# Patient Record
Sex: FEMALE | Race: WHITE
[De-identification: names, ages, dates, MRNs, and addresses within clinical notes are randomized per-mention and may not be internally consistent; named-entity substitution may affect disease eponyms.]

---

## 2018-08-31 ENCOUNTER — HOSPITAL ENCOUNTER (OUTPATIENT)
Dept: HOSPITAL 39 - ER | Age: 83
Setting detail: OBSERVATION
LOS: 1 days | Discharge: NURSING, REHAB OR LONG-TERM CARE | End: 2018-09-01
Attending: NURSE PRACTITIONER | Admitting: NURSE PRACTITIONER
Payer: MEDICARE

## 2018-08-31 DIAGNOSIS — R55: ICD-10-CM

## 2018-08-31 DIAGNOSIS — R42: ICD-10-CM
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
## 2018-08-31 DIAGNOSIS — Z85.3: ICD-10-CM

## 2018-08-31 DIAGNOSIS — E11.9: ICD-10-CM

## 2018-08-31 DIAGNOSIS — E87.6: ICD-10-CM

## 2018-08-31 DIAGNOSIS — Z98.1: ICD-10-CM

## 2018-08-31 DIAGNOSIS — Z88.7: ICD-10-CM

## 2018-08-31 DIAGNOSIS — Z88.6: ICD-10-CM

## 2018-08-31 DIAGNOSIS — S52.571A: Primary | ICD-10-CM

## 2018-08-31 DIAGNOSIS — Z88.8: ICD-10-CM

## 2018-08-31 DIAGNOSIS — Z90.13: ICD-10-CM

## 2018-08-31 DIAGNOSIS — H91.8X3: ICD-10-CM

## 2018-08-31 DIAGNOSIS — Z79.2: ICD-10-CM
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
## 2018-08-31 DIAGNOSIS — I45.10: ICD-10-CM

## 2018-08-31 DIAGNOSIS — N30.20: ICD-10-CM

## 2018-08-31 DIAGNOSIS — S09.90XA: ICD-10-CM

## 2018-08-31 DIAGNOSIS — E83.42: ICD-10-CM

## 2018-08-31 DIAGNOSIS — S59.292A: ICD-10-CM

## 2018-08-31 DIAGNOSIS — E03.9: ICD-10-CM

## 2018-08-31 DIAGNOSIS — Z79.84: ICD-10-CM

## 2018-08-31 DIAGNOSIS — I10: ICD-10-CM

## 2018-08-31 DIAGNOSIS — Z79.891: ICD-10-CM

## 2018-08-31 DIAGNOSIS — S59.092A: ICD-10-CM

## 2018-08-31 DIAGNOSIS — M25.512: ICD-10-CM

## 2018-08-31 DIAGNOSIS — Z96.643: ICD-10-CM

## 2018-08-31 DIAGNOSIS — Z79.899: ICD-10-CM

## 2018-08-31 DIAGNOSIS — M25.522: ICD-10-CM

## 2018-08-31 PROCEDURE — 97162 PT EVAL MOD COMPLEX 30 MIN: CPT

## 2018-08-31 PROCEDURE — 85025 COMPLETE CBC W/AUTO DIFF WBC: CPT

## 2018-08-31 PROCEDURE — 97530 THERAPEUTIC ACTIVITIES: CPT

## 2018-08-31 PROCEDURE — 96375 TX/PRO/DX INJ NEW DRUG ADDON: CPT

## 2018-08-31 PROCEDURE — 83880 ASSAY OF NATRIURETIC PEPTIDE: CPT

## 2018-08-31 PROCEDURE — 81001 URINALYSIS AUTO W/SCOPE: CPT

## 2018-08-31 PROCEDURE — 85610 PROTHROMBIN TIME: CPT

## 2018-08-31 PROCEDURE — 80053 COMPREHEN METABOLIC PANEL: CPT

## 2018-08-31 PROCEDURE — 36415 COLL VENOUS BLD VENIPUNCTURE: CPT

## 2018-08-31 PROCEDURE — 73030 X-RAY EXAM OF SHOULDER: CPT

## 2018-08-31 PROCEDURE — 96372 THER/PROPH/DIAG INJ SC/IM: CPT

## 2018-08-31 PROCEDURE — 73070 X-RAY EXAM OF ELBOW: CPT

## 2018-08-31 PROCEDURE — 84484 ASSAY OF TROPONIN QUANT: CPT

## 2018-08-31 PROCEDURE — 29125 APPL SHORT ARM SPLINT STATIC: CPT

## 2018-08-31 PROCEDURE — 85730 THROMBOPLASTIN TIME PARTIAL: CPT

## 2018-08-31 PROCEDURE — 99285 EMERGENCY DEPT VISIT HI MDM: CPT

## 2018-08-31 PROCEDURE — 83735 ASSAY OF MAGNESIUM: CPT

## 2018-08-31 PROCEDURE — 82948 REAGENT STRIP/BLOOD GLUCOSE: CPT

## 2018-08-31 PROCEDURE — 73110 X-RAY EXAM OF WRIST: CPT

## 2018-08-31 PROCEDURE — 96367 TX/PROPH/DG ADDL SEQ IV INF: CPT

## 2018-08-31 PROCEDURE — 82553 CREATINE MB FRACTION: CPT

## 2018-08-31 PROCEDURE — 93005 ELECTROCARDIOGRAM TRACING: CPT

## 2018-08-31 PROCEDURE — 80048 BASIC METABOLIC PNL TOTAL CA: CPT

## 2018-08-31 PROCEDURE — 94760 N-INVAS EAR/PLS OXIMETRY 1: CPT

## 2018-08-31 PROCEDURE — 72040 X-RAY EXAM NECK SPINE 2-3 VW: CPT

## 2018-08-31 PROCEDURE — 96365 THER/PROPH/DIAG IV INF INIT: CPT

## 2018-08-31 PROCEDURE — 36416 COLLJ CAPILLARY BLOOD SPEC: CPT

## 2018-08-31 PROCEDURE — 70450 CT HEAD/BRAIN W/O DYE: CPT

## 2018-08-31 PROCEDURE — 82550 ASSAY OF CK (CPK): CPT

## 2018-08-31 PROCEDURE — 87086 URINE CULTURE/COLONY COUNT: CPT

## 2018-08-31 PROCEDURE — 96366 THER/PROPH/DIAG IV INF ADDON: CPT

## 2018-08-31 RX ADMIN — INSULIN LISPRO SCH: 100 INJECTION, SOLUTION INTRAVENOUS; SUBCUTANEOUS at 17:57

## 2018-08-31 RX ADMIN — INSULIN LISPRO SCH UNITS: 100 INJECTION, SOLUTION INTRAVENOUS; SUBCUTANEOUS at 21:02

## 2018-08-31 RX ADMIN — POTASSIUM CHLORIDE SCH MEQ: 20 TABLET, EXTENDED RELEASE ORAL at 17:59

## 2018-08-31 RX ADMIN — HYDROCODONE BITARTRATE AND ACETAMINOPHEN PRN EA: 5; 325 TABLET ORAL at 16:09

## 2018-08-31 RX ADMIN — POTASSIUM CHLORIDE, DEXTROSE MONOHYDRATE AND SODIUM CHLORIDE PRN MLS/HR: 150; 5; 900 INJECTION, SOLUTION INTRAVENOUS at 16:08

## 2018-08-31 RX ADMIN — HYDROCODONE BITARTRATE AND ACETAMINOPHEN PRN EA: 5; 325 TABLET ORAL at 20:42

## 2018-08-31 NOTE — RAD
EXAM DESCRIPTION:

Wrist,Right 3 Views



CLINICAL HISTORY:

82 years Female, near syncope fall



COMPARISON:

None.



FINDINGS:

Four views of the right wrist show an impacted, nondisplaced

transversely oriented distal right radial metaphyseal fracture

with mild comminution and intra-articular extension. No ulnar

fracture is identified. The carpal bones are intact. The bones

appear demineralized. Degenerative changes are noted in multiple

locations, worse at the radiocarpal and first CMC joints.

Vascular calcifications are present.



IMPRESSION:

Impacted, slightly comminuted distal right radial fracture with

intra-articular extension and no significant displacement.



Generalized bony demineralization, degenerative changes and

vascular calcifications.



Electronically signed by:  Dante Reddy MD  8/31/2018 11:07 AM CDT

Workstation: [id number]

## 2018-08-31 NOTE — CT
EXAM DESCRIPTION: 

Head: Computed Tomography.



CLINICAL HISTORY: 

nears syncope, fall



COMPARISON:

Multiple orthopedic radiographs on this visit. CT head without

contrast 3/15/2012.



TECHNIQUE: 

Non-helical axial scans through the skull and brain, at 5.0 mm

intervals, non-contrast. Coronal and sagittal 2.0 mm

reconstructions.  Helical axial 2.5 mm reconstructions. Total

Exam DLP: 859.97 mGy-cm.  This exam was performed according to

our departmental dose-optimization program which includes

automated exposure control, adjustment of the mA and/or kV

according to patient size and/or use of iterative reconstruction

technique; to reduce radiation dose to as low as reasonably

achievable (ALARA).



FINDINGS: 

No intra-axial hemorrhage, no mass-effect, and no midline shift.

Normal gray-white matter differentiation. Bilateral low-density

in the extreme capsules, abutting the insular cortices. Stable

since the prior study. Scattered bilateral low-density in the

subcortical white matter more than the periventricular white

matter. New focal area of low-density in the anterior right

parietal subcortical white matter near the vertex since the prior

study.  No abnormal radiodense material in the brain parenchyma.

Vascular calcifications anterior and posterior.; physiologic

calcifications in the pineal gland and choroid plexus. 



No effacement or displacement of the ventricles, CSF spaces, or

subdural spaces. No extra axial fluid collection or hemorrhage.

No gross abnormalities of the bony calvarium. Bossing of the

bilateral inner tables of the parietal bones more prevalent

toward the vertex of the calvarium. Sclerosis of some of the

walls of the inferior right mastoid air cells with loss of air

cells. No fractures.



IMPRESSION: 

1. No hemorrhage, no mass effect, no midline shift. Multiple old

bilateral areas of ischemia more prevalent in the subcortical

white matter and in the periventricular white matter. Bilateral

low-density in the external capsules abutting the insular cortex

is stable. New region of low-density in the anterior right

parietal subcortical white matter near the vertex since the prior

study, but no mass effect.

2. CT scans are insensitive for detecting small CVAs in the first

24 hours after onset. Evaluation of the brain stem is also

limited. If symptoms persist, consider NON-EMERGENT MRI scan of

the brain with diffusion imaging.

3. Bilateral bossing of the inner table of the parietal bones and

sclerosis and thickening of the walls around the inferior right

mastoid air cells stable since the prior study.



Electronically signed by:  Bucky Bland MD  8/31/2018 10:50 AM

CDT Workstation: [id number]

## 2018-08-31 NOTE — RAD
EXAM DESCRIPTION:

Cervical Spine,3 Views



CLINICAL HISTORY:

82 years Female, near syncope fall



COMPARISON:

None.



FINDINGS:

Four views of the cervical spine show postoperative changes

related to previous ACDF at C5-6 and C6-7. No hardware

complication is identified. No vertebral body fracture or

subluxation is seen. No prevertebral soft tissue swelling.

Degenerative disc disease is noted at C3-4 and C4-5. Facet joint

degeneration is also noted at several levels. Soft tissue

calcifications in the right side of the neck may be of vascular

origin.



IMPRESSION:

Postoperative and degenerative changes, but no acute cervical

spine abnormality.



Possible carotid artery disease. If relevant, nonemergent

ultrasound should be considered for further evaluation.



Electronically signed by:  Dante Reddy MD  8/31/2018 11:06 AM CDT

Workstation: [id number]

## 2018-08-31 NOTE — HP
SUPERVISING PHYSICIAN:  Geovani Bah M.D.



CHIEF COMPLAINT:  Bilateral wrist pain.



HISTORY OF PRESENT ILLNESS:  Ms. Erazo is an 82 year-old  female 
patient that presented to the Emergency Room today after she had a near 
syncopal episode resulting in a fall at home.  The patient noted that she had 
not been feeling well over the last couple of days.  When she was getting up 
from a supine position to go to the bathroom, she had a near syncopal episode.  
She denied that she had passed out and that she had hit her head.  On arrival 
to the Emergency Room, she was having some pain in her left wrist as well as 
discomfort extending up the left arm.  She was also noted to have some pain in 
the right wrist.  Workup in the E. R. included radiographic studies of 
bilateral wrists which showed bilateral wrist fractures.  She also had a CT of 
the head without contrast and per radiology interpretation showed no hemorrhage
, mass effects or other acute findings, but there was note of a new region of 
low density in the anterior right parietal subcortical white matter near the 
vertex since previous exam in the last 6 years, but no mass effect.  She also 
had x-rays of the cervical spine that showed just postoperative degenerative 
changes, but no acute cervical spine abnormalities.  She also had examination 
radiographically of her left shoulder and left elbow which showed no acute 
abnormalities of the shoulder as well as without any acute findings of the left 
elbow per radiology interpretation.  Her laboratories were showing a normal 
white count and coagulation studies.  Chemistries showed mild hypokalemia with 
potassium 3.1, glucose 156.  Magnesium was shown to be low at 1.7, otherwise 
liver functions were all within normal limits as well as cardiac enzymes were 
shown to be within normal limits.  Vital signs on presentation to the Emergency 
Room showed that she was afebrile with temperature 96.6 and normotensive with 
blood pressure 135/64, heart rate 63, satting 96% on room air.  Dr. Diaz was 
consulted in the E. R. in regards to treatment of the bilateral wrist fractures 
who recommended conservative measures with splints on both wrists and followup 
in the outpatient setting.  Dr. Mg discussed plan of care in regards to the 
wrists and agreement was that conservative measures would be taken.  Given that 
the patient had a syncopal episode that was more likely related to diuretic use 
as she is on multiple diuretics, including Zebeta, Hydrochlorothiazide and Lasix
, the patient may have been having some orthostasis.  The patient also notes 
that she does have a history of vertigo and she did have some dizziness over 
the last several weeks periodically.  She is also taking Meclizine as needed 
for the dizziness.  Her EKG in the Emergency Department showed sinus 
bradycardia at 58 with an incomplete right bundle branch block, but no acute ST 
segment changes concerning for immediate ischemia.  The patient now is going to 
be placed in Observation overnight for further cardiac telemetry monitoring as 
well as neurologic checks given that she has had a syncopal episode as well as 
to assist with pain management in regards to the wrist fractures and a Physical 
Therapy consultation in the morning to further assess the patient's ability to 
function as she does live by herself at home.  She was placed in Observation in 
stable condition.



PAST MEDICAL HISTORY: 

1.   Hypertension.

2.   Hypothyroidism.

3.   Chronic vertigo.

4.   Diabetes mellitus type 2, diet controlled.

5.   Chronic kidney infections on Macrodantin.

6.   Extreme hearing loss bilaterally.

7.   Breast cancer with bilateral mastectomies in 1997.



PAST SURGICAL HISTORY:

1.   Bilateral mastectomy in 1997.

2.   Hysterectomy in 1997.

3.   Bilateral hip replacement.

4.   Cervical spine infusion.

5.   Bilateral cataract removal.



HOME MEDICATIONS:

1.   Macrobid 100 mg at bedtime.

2.   Ziac 1 b.i.d.

3.   Potassium chloride 20 mEq b.i.d.

4.   Levothyroxine 25 mcg at breakfast.

5.   Allegra 180 mg daily.

6.   Chlorthalidone 25 mg daily as needed for lower extremity swelling.

7.   Norco 5/325 one tablet every 6 hours as needed for pain.

8.   Lisinopril 10 mg daily.

9.   Lasix 80 mg daily.



ALLERGIES:  INFLUENZA VACCINE, ASPIRIN AND PREDNISONE.



FAMILY HISTORY:  Positive for vertigo in one of her brothers as well as 
diabetes mellitus, cancer and heart disease.



SOCIAL HISTORY:  The patient is retired.  She is  and lives in [place], [place].  She does live by herself.  She denies ever utilizing tobacco or 
drinking alcohol.



REVIEW OF SYSTEMS:  

CONSTITUTIONAL:  Noted just general malaise.  Denies any fevers, chills or 
weight change.

HEENT:  Denies any headaches, sore throat, nasal congestion or ear aches.  She 
does have a history of extreme hard-of-hearing as well as vertigo.

RESPIRATORY:  Denies any shortness of breath, coughing or wheezing.

CARDIOVASCULAR:  Denies any chest pains, palpitations, but as noted above near 
syncopal episode.

GASTROINTESTINAL:  Denies any nausea, vomiting or diarrhea.

GENITOURINARY:  Chronic history of urinary tract infections on Macrodantin with 
no reported symptoms, including dysuria, hematuria or polyuria, or other 
urinary symptoms.

MUSCULOSKELETAL:  As noted in history of present illness, bilateral wrist 
fractures.

INTEGUMENT:  No notable areas of skin breakdown, lesions, rashes, bruising.

NEUROLOGIC: She denies any headaches, ataxia.  As noted in history of present 
illness, syncopal or near syncopal episode.  Again no seizure activity.



PHYSICAL EXAMINATION: 



VITAL SIGNS:  Temperature 96.9, pulse 63, blood pressure 135/64, respirations 18
, satting 96% on room air.  Admission weight 75.0 kg.



GENERAL:  On admission to the Medical/Surgical floor, the patient appears 
comfortable, resting.  Currently eating a sandwich.  Appears to be in no acute 
distress.  She is alert and oriented.



HEENT:  Tympanic membranes are clear bilaterally.  She is extremely hard-of-
hearing bilaterally.  Oropharynx was pink and moist without any lesions.  



NECK:  There is no jugular venous distention.



CHEST:  Clear to auscultation bilaterally.



CARDIOVASCULAR:  Regular rate and rhythm.



ABDOMEN:  Soft, non-tender.  Positive bowel sounds.



EXTREMITIES:  Bilateral wrist splints in place as noted in History of Present 
Illness with capillary refill brisk bilaterally.  Strong , equal.  No 
clubbing, cyanosis or edema.



NEUROLOGIC:  No motor or sensory deficits are noted.  She remains alert and 
oriented times three.  Cranial nerves II-XII are grossly intact.  Facial 
features were symmetrical.  Extraocular movements are within normal limits.



LABORATORY:  White count on admission was showing normal at 5,100, hemoglobin 
13.8, hematocrit 40.0, platelet count 185,000.  Differential shows to be 
without a left shift.  Coagulations show normal PT and PTT.  Chemistries showed 
a mild low potassium at 3.1, BUN 25, creatinine 0.91.  Magnesium was low at 1.7
, calcium was normal at 9.2.  Bilirubin was slightly elevated at 1.5.  Other 
enzymes were all negative for elevation with an initial troponin of less than 
0.02.  Urinalysis was pending.  



RADIOLOGY:  She had multiple radiographic studies in the E. R., including 
bilateral wrists with right wrist showing an impacted nondisplaced transversely 
oriented distal right radial metaphyseal fracture with mild comminution and 
intraarticular extension but no ulnar fractures.  Her carpal bones are showing 
to be intact.  She also had a left wrist which showed transversely oriented 
slightly displaced and impacted distal left radial metaphyseal fracture with a 
mild dorsal angulation but no definite intraarticular extension.  There was 
note of a nondisplaced slightly impacted distal left ulnar and metaphysis 
fracture with other chronic changes as noted.  She had an x-ray of the left 
shoulder and elbow that showed just degenerative changes and no acute trauma.  
She had a CT of the head without contrast and per radiology interpretation 
showed no hemorrhage, mass effects or midline shift.  There was note of 
multiple old bilateral areas of ischemia more prevalent in the subcortical 
white matter and periventricular white matter with bilateral low-densities in 
the external capsules abutting the insular cortex, parietal subcortical white 
matter near the vertex since previous exam but no mass effect.  The cervical 
spine per radiology interpretation showed postoperative degenerative changes 
but no acute cervical spine abnormalities.  Again, x-rays of the right and left 
wrist showed on the right wrist an impacted nondisplaced transversally oriented 
distal right radial metaphyseal fracture with mild comminution and 
intraarticular extensions.  No ulnar fracture was identified.  The carpal bones 
were intact.  Left wrist per radiology interpretation showed left radial and 
ulnar fractures.  She also had a left shoulder and right elbow x-ray again with 
no acute findings.



ASSESSMENT: 

1.   Bilateral wrist fractures secondary to same level fall without mention of 
loss of 

      consciousness.

2.   Near syncopal episode, uncertain etiology although possibly related to 
excessive

      diuretic usage, again contributing to #1.

3.   Hypertension on multiple diuretics and blood pressure medicines possibly

      related to the syncopal episodes.

4.   Hypothyroidism.

5.   Chronic vertigo possibly contributing to #1.

6.   Type 2 diabetes mellitus on oral therapy only.

7.   Chronic cystitis on Macrodantin.

8.   Chronic hearing loss.

9.   Chronic vertigo possibly contributing to same level fall as noted in #1.



PLAN:  The patient is going to be placed in Observation tonight to be on 
cardiac telemetry as well as close neurological monitoring.  Will continue to 
await for urine cultures, but given that she has a history of chronic urinary 
tract infections and she  has not been symptomatic, will continue to await 
results of  UA.  Will anticipate length of stay to be at least 1 to 2 days with 
anticipation of being able to discharge tomorrow.  She will need a physical 
therapy consultation given that she has bilateral wrist fractures and chronic 
vertigo to ensure that she is safe to return home once clinically improved.  
Will continue with antibiotic coverage currently awaiting final culture results 
to target antibiotic therapy.  Until then, will continue to monitor and treat 
appropriately.  Again, hopefully be able to discharge in the morning.  Once 
stable and able to discharge, she will need close clinical followup with her 
primary care provider, Dr. Holbrook.



#[id number]
[facility] HospitalALFONSO

## 2018-08-31 NOTE — ED.PDOC
History of Present Illness





- General


Chief Complaint: Upper Extremity Injury


Stated Complaint: Bilateral wrist discomfort


Time Seen by Provider: 18 10:04


Source: patient


Exam Limitations: no limitations





- History of Present Illness


Initial Comments: 





the patient is an 82-year-old  female presenting to the emergency room 

after having a near syncopal episode that caused the fall in her home prior to 

arrival here.  The patient has not been feeling very well over the last couple 

of days.  She was getting up from lying down to go to the bathroom when she had 

the near-syncopal episode.  She did not pass out completely and does not think 

that she hit her head.  She is having some pain in the left wrist as well as 

mild discomfort extending up the left arm.  She is also having some pain in the 

right wrist.  She is not reporting any head pain.  She does have some mild neck 

pain though she states that she did not hit her head or her neck.  She has had 

a previous fusion in the past.  No other reported injuries.  She is alert and 

oriented.


Timing/Duration: momentarily


Severity: moderate


Improving Factors: immobilization


Worsening Factors: movement


Associated Symptoms: denies symptoms


Allergies/Adverse Reactions: 


Allergies





UNOBTAINABLE Allergy (Verified 18 10:13)


 








Home Medications: 


Ambulatory Orders





Bisoprolol Fumarate [Zebeta] 5 mg PO DAILY 18 


Chlorthalidone 25 mg PO DAILY 18 


Fexofenadine HCl [Allegra Allergy] 180 mg PO DAILY 18 


Furosemide Tab [Lasix Tab] 80 mg PO DAILY 18 


HYDROcodone 5MG/APAP 325MG [Norco 5/325] 1 tab PO Q6H PRN 18 


Levothyroxine Sodium 25 mcg PO DAILY 18 


Lisinopril 10 mg PO DAILY 18 


Potassium Chloride [K-Tab] 20 meq PO BID 18 











Review of Systems





- Review of Systems


Constitutional: States: malaise


EENTM: States: no symptoms reported


Respiratory: States: no symptoms reported


Cardiology: States: no symptoms reported


Gastrointestinal/Abdominal: States: no symptoms reported


Genitourinary: States: no symptoms reported


Musculoskeletal: States: see HPI


Skin: States: no symptoms reported


Neurological: States: headache


Endocrine: States: no symptoms reported


All other Systems: No Change from Baseline





Past Medical History (General)





- Patient Medical History


Hx Stroke: No


Hx Congestive Heart Failure: No


Hx Hypertension: Yes


Hx Diabetes: No


Hx Cancer: Yes - Breast CA with R lymph node disssection


Hx MRSA: No





- Vaccination History


Hx Influenza Vaccination: No


Hx Pneumococcal Vaccination: No





- Social History


Hx Tobacco Use: No


Hx Alcohol Use: No





Family Medical History





- Family History


  ** Mother


Family History: No Known


Living Status: 





Physical Exam





- Physical Exam


General Appearance: Alert, No apparent distress


Eye Exam: bilateral normal


Ears, Nose, Throat: hearing grossly normal, normal ENT inspection


Neck: non-tender, supple


Respiratory: lungs clear, normal breath sounds, no respiratory distress, no 

accessory muscle use


Cardiovascular/Chest: normal peripheral pulses, regular rate, rhythm, no edema


Peripheral Pulses: radial,right: 2+, radial,left: 2+, dorsalis pedis,right: 2+, 

dorsalis pedis,left: 2+


Gastrointestinal/Abdominal: non tender, soft


Rectal Exam: deferred


Back Exam: normal inspection, no vertebral tenderness


Extremity: no pedal edema, no calf tenderness, normal capillary refill, other - 

the patient has some swelling and pain in both wrists with both active and 

passive range of motion.  She does appear to be neurovascularly intact.


Neurologic: CNs II-XII nml as tested, no motor/sensory deficits, alert, normal 

mood/affect, oriented x 3


Skin Exam: normal color


Comments: 





 Vital Signs - 24 hr











  18





  10:02


 


Temperature 96.9 F L


 


Pulse Rate [ 63





Left Radial] 


 


Respiratory 18





Rate 


 


Blood Pressure 135/64





[Left Arm] 


 


O2 Sat by Pulse 96





Oximetry 














Progress





- Progress


Progress: 





18 12:32


the patient is an 82-year-old  female presenting to the emergency room 

secondary to a near syncopal episode that has led to a fall getting her 

bilateral wrist fractures.  The patient has been discussed with orthopedics who 

recommends conservative bracing for now.  The patient has agreed to that.  The 

patient is going to be admitted for observation for near syncope.  Head CT does 

show a new change when compared to the CT scan from 6 years ago.  She is not 

currently exhibiting any symptoms of a stroke currently.  If symptoms change 

and a repeat CT scan may be warranted.  I do believe that she likely had a near 

syncopal episode secondary to orthostasis probably related to diuretic use.  

The patient has been given a dose of potassium for hypokalemia and a dose of 

magnesium for hypomagnesemia and is receiving some IV fluids.  Admit for 

continued monitoringboth for telemetry reasons and for functional reasons as 

the patient does live on her own.  She may yet need to be placed in some form 

of assisted living or rehabilitation until she is able to function on her own.  

Continue pain control.  The patient still needs urinalysis to be collected.





- Results/Orders


Results/Orders: 





 Vital Signs - 24 hr











  18





  10:02


 


Temperature 96.9 F L


 


Pulse Rate [ 63





Left Radial] 


 


Respiratory 18





Rate 


 


Blood Pressure 135/64





[Left Arm] 


 


O2 Sat by Pulse 96





Oximetry 








 





urinalysis is still pending.


EKG shows sinus bradycardia at 58 bpm.  Incomplete right bundle branch block.  

No acute ST segment changes concerning for immediate ischemia.  Normal QT 

interval.





CT scan of the head shows multiple chronic findings and possibly a new focal 

area of low density in the anterior right parietal subcortical white matter 

compared to previous study in 2012.  No hemorrhage.  No mass effect.  No 

midline shift.  Multiple bilateral areas of ischemia.  Chronic changes 

otherwise.





X-ray of the left shoulder and left elbow showed degenerative changes.  No 

acute trauma however.  X-ray of the right wrist shows an impacted nondisplaced 

transversely oriented distal right radial metaphyseal fracture with mild 

comminution and intra-articular extension.  No ulnar fracture.  Carpal bones 

are intact.





X-ray of the left wrist shows a transversely oriented slightly displaced and 

impacted distal left radial metaphyseal fracture with mild dorsal angulation 

and no definite intra-articular extension.  There is a nondisplaced slightly 

impacted distal left ulnar metaphyseal fracture.  Chronic changes otherwise.








 Laboratory Results - last 24 hr











  18





  10:07 10:07 10:07


 


WBC   5.1 


 


RBC   4.29 


 


Hgb   13.8 


 


Hct   40.0 


 


MCV   93.2 


 


MCH   32.2 H 


 


MCHC   34.6 


 


RDW   13.4 


 


Plt Count   185 


 


MPV   8.3 


 


Absolute Neuts (auto)   2.50 


 


Absolute Lymphs (auto)   1.80 


 


Absolute Monos (auto)   0.50 


 


Absolute Eos (auto)   0.20 


 


Absolute Basos (auto)   0.00 


 


Neutrophils %   48.6 


 


Lymphocytes %   35.7 


 


Monocytes %   10.5 H 


 


Eosinophils %   4.5 


 


Basophils %   0.7 


 


PT    9.9


 


INR    0.99


 


PTT (SP)    21.9


 


Sodium  137  


 


Potassium  3.1 L  


 


Chloride  99 L  


 


Carbon Dioxide  25  


 


Anion Gap  16.1  


 


BUN  25 H  


 


Creatinine  0.91  


 


BUN/Creatinine Ratio  27.5 H  


 


Random Glucose  170 H  


 


Serum Osmolality  282.2  


 


Calcium  9.2  


 


Magnesium  1.7 L  


 


Total Bilirubin  1.5 H  


 


AST  22  


 


ALT  16  


 


Alkaline Phosphatase  67  


 


Creatine Kinase  62  


 


CK-MB (CK-2)  1.9  


 


CK-MB (CK-2) %  Not Reportable  


 


Troponin I  < 0.02  


 


B-Natriuretic Peptide  32.9  


 


Serum Total Protein  6.5  


 


Albumin  3.6  


 


Globulin  2.9  


 


Albumin/Globulin Ratio  1.2  














Departure





- Departure


Clinical Impression: 


 Syncope, near, Orthostasis, Hypokalemia, Hypomagnesemia





Wrist fracture, bilateral


Qualifiers:


 Encounter type: initial encounter Fracture type: closed Qualified Code(s): 

S62.101A - Fracture of unspecified carpal bone, right wrist, initial encounter 

for closed fracture; S62.102A - Fracture of unspecified carpal bone, left wrist

, initial encounter for closed fracture; S62.102A - Fracture of unspecified 

carpal bone, left wrist, initial encounter for closed fracture





Disposition: Admit Patient


Referrals: 


JARRETT PICKERING [Primary Care Provider] - 1-2 Weeks


Home Medications: 


Ambulatory Orders





Bisoprolol Fumarate [Zebeta] 5 mg PO DAILY 18 


Chlorthalidone 25 mg PO DAILY 18 


Fexofenadine HCl [Allegra Allergy] 180 mg PO DAILY 18 


Furosemide Tab [Lasix Tab] 80 mg PO DAILY 18 


HYDROcodone 5MG/APAP 325MG [Norco 5/325] 1 tab PO Q6H PRN 18 


Levothyroxine Sodium 25 mcg PO DAILY 18 


Lisinopril 10 mg PO DAILY 18 


Potassium Chloride [K-Tab] 20 meq PO BID 18 











Decision To Admit





- Decistion To Admit


Decision to Admit Reason: Medical Nature


Decision to Admit Date: 18


Decision to Admit Time: 12:35

## 2018-08-31 NOTE — RAD
Procedure:  XR ELBOW 1-2 VIEWS        



Exam Date:  8/31/2018



Ordering Provider:  Raf Mg



Clinical Indication:  fall with pain



Comparison: None





FINDINGS:  

There is no fracture or dislocation. 

No joint effusion. 

No subcutaneous gas. 



IMPRESSION:

1. No acute fracture or dislocation of the left elbow.



Electronically signed by:  Faizan Velazquez MD  8/31/2018 11:05 AM CDT

Workstation: [id number]

## 2018-08-31 NOTE — RAD
EXAM DESCRIPTION:

Wrist,Left 3 Views



CLINICAL HISTORY:

82 years Female, near syncope fall



COMPARISON:

None.



FINDINGS:

Three views of the left wrist show a transversely oriented,

slightly displaced and impacted distal left radial metaphyseal

fracture with mild dorsal angulation and no definite

intra-articular extension. There is a nondisplaced, slightly

impacted distal left ulnar metaphyseal fracture.

Chondrocalcinosis involves the triangular fibrocartilage complex

and also the scapholunate ligament, likely degenerative.

Degenerative changes are noted in the left wrist including joint

space narrowing, worse at the first CMC joint. The bones appear

demineralized. Vascular calcifications are noted.



IMPRESSION:

Distal left radial and ulnar fractures as detailed above.



Generalized bony demineralization and degenerative changes.



Atherosclerotic vascular disease.



Electronically signed by:  Dante Reddy MD  8/31/2018 11:05 AM CDT

Workstation: [id number]

## 2018-09-01 VITALS — OXYGEN SATURATION: 93 % | TEMPERATURE: 97.9 F | DIASTOLIC BLOOD PRESSURE: 61 MMHG | SYSTOLIC BLOOD PRESSURE: 109 MMHG

## 2018-09-01 RX ADMIN — HYDROCODONE BITARTRATE AND ACETAMINOPHEN PRN EA: 10; 325 TABLET ORAL at 12:43

## 2018-09-01 RX ADMIN — HYDROCODONE BITARTRATE AND ACETAMINOPHEN PRN EA: 10; 325 TABLET ORAL at 08:43

## 2018-09-01 RX ADMIN — INSULIN LISPRO SCH: 100 INJECTION, SOLUTION INTRAVENOUS; SUBCUTANEOUS at 11:40

## 2018-09-01 RX ADMIN — INSULIN LISPRO SCH: 100 INJECTION, SOLUTION INTRAVENOUS; SUBCUTANEOUS at 07:20

## 2018-09-01 RX ADMIN — INSULIN LISPRO SCH: 100 INJECTION, SOLUTION INTRAVENOUS; SUBCUTANEOUS at 08:04

## 2018-09-01 RX ADMIN — POTASSIUM CHLORIDE, DEXTROSE MONOHYDRATE AND SODIUM CHLORIDE PRN MLS/HR: 150; 5; 900 INJECTION, SOLUTION INTRAVENOUS at 03:57

## 2018-09-01 RX ADMIN — POTASSIUM CHLORIDE SCH MEQ: 20 TABLET, EXTENDED RELEASE ORAL at 08:03

## 2018-09-01 RX ADMIN — HYDROCODONE BITARTRATE AND ACETAMINOPHEN PRN EA: 10; 325 TABLET ORAL at 01:41

## 2018-09-10 NOTE — DS
SUPERVISING PHYSICIAN:  Geovani Bah M.D.



ADMISSION DIAGNOSES:

1.   Bilateral wrist fractures secondary to same level fall without mention of 
loss of 

      consciousness.

2.   Near syncopal episode, uncertain etiology 0possibly related to excessive

      diuretic usage contributing to #1.

3.   Hypertension on multiple diuretics and blood pressure medicines possibly

      related to the syncopal episodes.

4.   Hypothyroidism, on supplementation.

5.   Chronic vertigo possibly contributing to #1.

6.   Type 2 diabetes mellitus on oral therapy only.

7.   Chronic cystitis on Macrodantin.

8.   Chronic hearing loss.

9.   Chronic vertigo possibly contributing to same level fall as noted in #1.



DISCHARGE DIAGNOSES:

1.   Bilateral wrist fractures secondary to same level fall. Stable in braces.

2.   Near syncopal episode, uncertain etiology, likely related to excessive

      diuretic usage contributing to #1.

3.   Hypertension well controlled. .

4.   Hypothyroidism, on supplementation.

5.   Chronic vertigo possibly contributing to #2.

6.   Type 2 diabetes mellitus on oral therapy and stable. 

7.   Chronic cystitis with current culture results showing a beta 

       streptococcus not group A or B, patient on Macrobid.

8.   Chronic hearing loss.



REASON FOR HOSPITALIZATION:   Ms. Erazo is an 82 year-old  female 
patient that presented to the Emergency Room today after she had a near 
syncopal episode resulting in a fall at home.  The patient noted that she had 
not been feeling well over the last couple of days.  When she was getting up 
from a supine position to go to the bathroom, she had a near syncopal episode.  
She denied that she had passed out and that she had hit her head.  On arrival 
to the Emergency Room, she was having some pain in her left wrist as well as 
discomfort extending up the left arm.  She was also noted to have some pain in 
the right wrist.  Workup in the Emergency Room included radiographic studies of 
bilateral wrists which showed bilateral wrist fractures.  She also had a CT of 
the head without contrast and per radiology interpretation showed no hemorrhage
, mass effects or other acute findings, but there was note of a new region of 
low density in the anterior right parietal subcortical white matter near the 
vertex since previous exam in the last 6 years, but no mass effect.  She also 
had x-rays of the cervical spine that showed just postoperative degenerative 
changes, but no acute cervical spine abnormalities.  She also had examination 
radiographically of her left shoulder and left elbow which showed no acute 
abnormalities of the shoulder as well as without any acute findings of the left 
elbow per radiology interpretation.  Her laboratories were showing a normal 
white count and coagulation studies.  Chemistries showed mild hypokalemia with 
potassium 3.1, glucose 156.  Magnesium was shown to be low at 1.7, otherwise 
liver functions were all within normal limits as well as cardiac enzymes were 
shown to be within normal limits.  Vital signs on presentation to the Emergency 
Room showed that she was afebrile with temperature 96.6 and normotensive with 
blood pressure 135/64, heart rate 63, satting 96% on room air.  Dr. Diaz was 
consulted in the Emergency Room.in regards to treatment of the bilateral wrist 
fractures who recommended conservative measures with splints on both wrists and 
followup in the outpatient setting.  Dr. Mg discussed plan of care in 
regards to the wrists and agreement was that conservative measures would be 
taken.  Given that the patient had a syncopal episode that was more likely 
related to diuretic use as she is on multiple diuretics, including Zebeta, 
Hydrochlorothiazide and Lasix, the patient may have been having some 
orthostasis.  The patient also notes that she does have a history of vertigo 
and she did have some dizziness over the last several weeks periodically.  She 
is also taking Meclizine as needed for the dizziness.  Her EKG in the Emergency 
Department showed sinus bradycardia at 58 with an incomplete right bundle 
branch block, but no acute ST segment changes concerning for immediate 
ischemia.  The patient now is going to be placed in Observation overnight for 
further cardiac telemetry monitoring as well as neurologic checks given that 
she has had a syncopal episode as well as to assist with pain management in 
regards to the wrist fractures and a Physical Therapy consultation in the 
morning to further assess the patient's ability to function as she does live by 
herself at home.  She was placed in Observation in stable condition.



LABORATORY: CBC on admission showed to be within normal limits with a white 
count of 5.1,  hemoglobin 13.8, hematocrit 40.0, platelet count 185,000.  
Differential shows to be without a left shift.  Coagulations were normal.  
Chemistries on admission showed a low potassium at 3.1, BUN elevated at 25, 
creatinine 0.91.  At discharge electrolytes had normalized with potassium 4.0.  
Blood sugars range from 124 to 170, calcium showing to be normal at 9.2.  
Magnesium was low at 1.7, at discharge was 2.0.  Bilirubin was slightly 
elevated at 1.5.  Otherwise, liver functions were within normal limits.  BNP 
was normal at 32.9, troponin normal at less than 0.02.  Urinalysis did show a 
trace of leukoesterase with microscopic revealing RBC of 3 to 5, WBC 10 to 20, 
one to 2 epithelials, 4+ bacteria. 



MICROBIOLOGY:  Clean catch urine specimen showed 10 to 50,000 colony forming 
units of beta streptococcus not group A or B.



RADIOLOGY:  She had x-rays of the cervical spine, head CT which per radiology 
interpretation had no acute findings.  Please see those results for full 
details for chronic findings.  She had bilateral wrist x-rays with the right 
showing an impacted slightly comminuted distal right radial fracture with 
intraarticular extension and no significant displacement.  Left x-ray of the 
wrist per radiology interpretation showed distal left radial and ulnar 
fractures.  Please see that report for full details.  She also had an x-ray of 
the left elbow with no acute findings of fractures or dislocations per 
radiology interpretation.  She had a shoulder x-ray on the left which per 
radiology interpretation showed degenerative changes but no acute left shoulder 
abnormalities.  Please see that report for full details on the chronic findings.



PHYSICAL EXAMINATION ON DISCHARGE:

VITAL SIGNS:  Temperature 97.9, pulse 70, blood pressure 109/61, respirations 18
, saturation 93% on room air.

GENERAL:  The patient was alert, showing to be in no acute distress.

CHEST:  Lungs clear to auscultation.

HEART:  Regular rate and rhythm.

ABDOMEN:  Soft and non-tender.  Positive bowel sounds.

EXTREMITIES:: Bilateral wrists have braces in place with pulses strong 
bilaterally with capillary refill being brisk bilaterally. 

NEUROLOGIC:  She was alert and oriented x3 with no reported neuro or sensory 
deficits.



HOSPITAL COURSE:  Mr. Erazo was placed in observation on 08/31/18 after she 
sustained a same level fall and resulted in bilateral wrist fractures.  She was 
seen in the Emergency Room and consulted with Dr. Diaz who recommended she be 
placed in simple splints and be followed up in an outpatient setting with no 
recommendations for surgical followup.  She had good pain control but was still 
having significant difficulty with daily activities of daily living.  Therefore
, a consultation with [facility] in [place] was secured 
with the patient being accepted into inpatient rehabilitation.  On date of 
discharge, she was felt clinically stable enough to be discharged to be 
admitted to the rehab hospital in [place]. 



PLAN:  She was discharged and admitted in [facility] in 
[place].  She was to resume all medications are previously instructed.  
She was to decrease her Lasix dosage to 40 mg daily and continue with current 
antibiotic treatment with Microbid with Ciprofloxacin 500 twice a day for 10 
days, to continue with full treatment.  She is to followup with Dr. Holbrook and 
Dr. Diaz after discharge from [facility] as instructed..



Diet at discharge was regular diet as tolerated.  Activities as per physical 
therapy at rehabilitation hospital.  New prescription at discharge included:

1. Ciprofloxacin 500 mg twice a day for 10 days, #20.

2.  Lasix 40 mg daily, #30.



Condition on discharge was stable and improved.



#[id number]

#[id number]
[facility]

## 2018-09-17 ENCOUNTER — HOSPITAL ENCOUNTER (OUTPATIENT)
Dept: HOSPITAL 39 - RAD | Age: 83
End: 2018-09-17
Attending: ORTHOPAEDIC SURGERY
Payer: MEDICARE

## 2018-09-17 DIAGNOSIS — S52.202A: ICD-10-CM

## 2018-09-17 DIAGNOSIS — S52.502A: ICD-10-CM

## 2018-09-17 DIAGNOSIS — S52.501A: Primary | ICD-10-CM

## 2018-09-17 NOTE — RAD
EXAM DESCRIPTION: 



Wrist,Left 3 Views



CLINICAL HISTORY: 



82 years, Female, WRIST PAIN



COMPARISON: 



August 31, 2018



TECHNIQUE: 



AP/ lateral/ oblique views of the left wrist.



FINDINGS: 



Healing fractures of the distal radius and ulna in essentially

anatomic alignment with slight impaction of the distal radius is

noted. Advanced degenerative changes and modest osteopenia is

present. Residual fracture lines persist. There is still modest

dorsal angulation of the distal radius on the lateral view,

little changed from prior study.



IMPRESSION:



1.  Healing left wrist fractures of the distal radius and ulna

with degenerative changes and persistent dorsal angulation of the

distal radius.



Electronically signed by:  Mannie Mooney MD  9/17/2018 9:49 AM

CDT Workstation: [id number]

## 2018-09-17 NOTE — RAD
EXAM DESCRIPTION: 

Wrist,Right 3 Views



CLINICAL HISTORY: 

WRIST PAIN. Bilateral.



COMPARISON: 

Radiographs of the left wrist on the same visit.



TECHNIQUE: 

AP, lateral, and oblique images right wrist.



FINDINGS: 

Overall bone density is significantly decreased. Fracture line in

the mid and epiphysis of the right radius with cortical

irregularity, and dorsiflexion of the radius at this fracture

site. The radiocarpal joint is maintained though containing bone

densities and narrowing of the joint. Question of fracture line

extending into the joint space opposing the scapholunate joint

space. Distal right ulna is intact. Calcification in the TFCC.

Calcification abutting the radial styloid and the scaphoid bone.

Narrowing of the scapholunate, scaphoid trapezium, and scaphoid

trapezoid joints. Marked narrowing and sclerosis of the

carpometacarpal joint with radial subluxation of the metacarpal..



IMPRESSION: Colles' type dorsiflexion fracture of the distal

right radius at the meta-epiphysis with possible fracture

extending into the joint space and narrowing of the joint space.

No distal right ulnar fracture. Arthrosis mostly on the radial

aspect of the wrist and the thumb carpometacarpal joint.



Electronically signed by:  Bucky Bland MD  9/17/2018 10:25 AM

CDT Workstation: [id number]

## 2018-09-28 ENCOUNTER — HOSPITAL ENCOUNTER (OUTPATIENT)
Dept: HOSPITAL 39 - RAD | Age: 83
End: 2018-09-28
Attending: ORTHOPAEDIC SURGERY
Payer: MEDICARE

## 2018-09-28 DIAGNOSIS — S52.501D: Primary | ICD-10-CM

## 2018-09-28 DIAGNOSIS — S52.502D: ICD-10-CM

## 2018-09-28 NOTE — RAD
EXAM DESCRIPTION:

Wrist,Left 3 Views



CLINICAL HISTORY:

82 years Female, CLOSED FRACTURE OF DISTAL END OF RADIUS

BILATERAL



COMPARISON:

September 17, 2018



FINDINGS:

Again seen is a transversely oriented distal left radial fracture

which remains partially united. Minimal dorsal displacement and

dorsal regulation of the distal fracture fragment with interval

increase in sclerosis at the fracture site suggestive of

increased degree of union. There is a nondisplaced distal left

ulnar fracture which also demonstrates increased sclerosis at the

fracture site. Degenerative changes are noted in the left wrist,

worse at the first CMC joint. Vascular calcifications are

present.



IMPRESSION:

Partially united distal left radial and ulnar fractures as

detailed above with interval increase in degree of union from

September 17, 2018.



Electronically signed by:  Dante Reddy MD  9/28/2018 2:22 PM CDT

Workstation: [id number]

## 2018-09-28 NOTE — RAD
EXAM DESCRIPTION:

Wrist,Right 3 Views



CLINICAL HISTORY:

82 years Female, CLOSED FRACTURE OF DISTAL END OF RADIUS,

BILATERAL



COMPARISON:

September 17, 2018



FINDINGS:

Again seen is a partially united distal right radial metaphyseal

fracture with minimal dorsal displacement and slight dorsal

relation of the distal fracture fragment. Increased sclerosis

about the fracture plane suggests interval healing. No new

fracture or malalignment. Again seen are fairly advanced

degenerative changes in the radiocarpal, triscaphe and first CMC

joints. Vascular calcifications are present.



IMPRESSION:

Partially united distal right radial fracture as detailed above

with interval increase in degree of union from September 17, 2018. No new abnormality.



Electronically signed by:  Dante Reddy MD  9/28/2018 2:20 PM CDT

Workstation: [id number]

## 2018-10-19 ENCOUNTER — HOSPITAL ENCOUNTER (OUTPATIENT)
Dept: HOSPITAL 39 - RAD | Age: 83
End: 2018-10-19
Attending: ORTHOPAEDIC SURGERY
Payer: MEDICARE

## 2018-10-19 DIAGNOSIS — S52.501D: Primary | ICD-10-CM

## 2018-10-19 DIAGNOSIS — S52.502D: ICD-10-CM

## 2018-10-19 NOTE — RAD
EXAM DESCRIPTION: 



Wrist,Left 3 Views



CLINICAL HISTORY: 



FX



COMPARISON: 



September 28, 2018.



IMPRESSION: 



3 views of the left wrist were acquired.

Redemonstrated distal ulnar and radial metaphyseal fractures. The

fracture lines are slightly less conspicuous with respect to

September 28 study favorable for progressive healing. No other

significant change is demonstrated.



Electronically signed by:  Matti Adame MD  10/19/2018 1:18 PM

CDT Workstation: [id number]

## 2018-10-19 NOTE — RAD
EXAM DESCRIPTION: 



Wrist,Right 3 Views



CLINICAL HISTORY: 



FX



COMPARISON: 



September 28, 2018.



IMPRESSION: 



3 views of the right wrist.

Again seen is a distal radial metaphyseal fracture. Fracture

exhibits exuberant callus formation. The fracture line itself is

less conspicuous with respect to the September 28 study favorable

for progressive healing. No other significant change is

demonstrated.



Electronically signed by:  Matti Adame MD  10/19/2018 1:15 PM

CDT Workstation: [id number]

## 2018-11-20 ENCOUNTER — HOSPITAL ENCOUNTER (OUTPATIENT)
Dept: HOSPITAL 39 - RAD | Age: 83
End: 2018-11-20
Attending: ORTHOPAEDIC SURGERY
Payer: MEDICARE

## 2018-11-20 DIAGNOSIS — S52.502D: ICD-10-CM

## 2018-11-20 DIAGNOSIS — S52.501D: Primary | ICD-10-CM

## 2018-11-21 NOTE — RAD
EXAM: Wrist,Right 3 Views



CLINICAL HISTORY: Right wrist fracture



COMPARISON STUDY:  Right wrist images October 19, 2018

 

TECHNICAL: AP, lateral and oblique images of the right wrist 



FINDINGS: The transverse fracture through the distal radial

metaphysis appears fused. No new injury is identified. There are

moderate to severe degenerative changes of the wrist, unchanged.

Vascular calcifications are present.

    

IMPRESSION: 

1. The right distal radial fracture appears fused.

2. Moderate to severe osteoarthritic changes are stable.



 



Electronically signed by:  Juan Carlos Davalos MD  11/21/2018 10:18 AM

[facility] Workstation: [id number]

## 2018-11-21 NOTE — RAD
EXAM: Wrist,Left 3 Views



CLINICAL HISTORY: Left wrist fracture



COMPARISON STUDY:  October 19, 2018

 

TECHNICAL: AP, lateral and oblique images of the left wrist 



FINDINGS: The transverse fracture through the distal radial

metaphysis is fused. There is no new fracture. There is no

dislocation. There are severe degenerative changes of the lateral

carpal row. Vascular calcifications persist.

    

IMPRESSION: 

1. Fused distal radial fracture.

2. Severe degenerative changes of the lateral carpal row.



 



Electronically signed by:  Juan Carlos Davalos MD  11/21/2018 10:19 AM

[facility] Workstation: [id number]

## 2019-08-27 ENCOUNTER — HOSPITAL ENCOUNTER (INPATIENT)
Dept: HOSPITAL 39 - ER | Age: 84
LOS: 3 days | Discharge: HOME HEALTH | DRG: 563 | End: 2019-08-30
Attending: NURSE PRACTITIONER | Admitting: NURSE PRACTITIONER
Payer: MEDICARE

## 2019-08-27 DIAGNOSIS — H91.90: ICD-10-CM

## 2019-08-27 DIAGNOSIS — S52.601B: Primary | ICD-10-CM

## 2019-08-27 DIAGNOSIS — N39.0: ICD-10-CM

## 2019-08-27 DIAGNOSIS — R42: ICD-10-CM

## 2019-08-27 DIAGNOSIS — Z79.899: ICD-10-CM

## 2019-08-27 DIAGNOSIS — Z85.3: ICD-10-CM

## 2019-08-27 DIAGNOSIS — I10: ICD-10-CM

## 2019-08-27 DIAGNOSIS — Z79.2: ICD-10-CM

## 2019-08-27 DIAGNOSIS — S52.501A: ICD-10-CM
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
## 2019-08-27 DIAGNOSIS — M81.0: ICD-10-CM

## 2019-08-27 DIAGNOSIS — S06.0X0A: ICD-10-CM

## 2019-08-27 DIAGNOSIS — E87.6: ICD-10-CM

## 2019-08-27 DIAGNOSIS — I95.9: ICD-10-CM
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
## 2019-08-27 DIAGNOSIS — Z79.891: ICD-10-CM
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
## 2019-08-27 DIAGNOSIS — E03.9: ICD-10-CM

## 2019-08-27 DIAGNOSIS — S00.03XA: ICD-10-CM

## 2019-08-27 DIAGNOSIS — R29.6: ICD-10-CM

## 2019-08-27 DIAGNOSIS — Z96.643: ICD-10-CM

## 2019-08-27 DIAGNOSIS — E11.9: ICD-10-CM

## 2019-08-27 RX ADMIN — FUROSEMIDE SCH: 40 TABLET ORAL at 20:05

## 2019-08-27 NOTE — ED.PDOC
History of Present Illness





- General


Chief Complaint: Trauma


Stated Complaint: s/p fall


Time Seen by Provider: 19 15:20


Source: patient, family


Exam Limitations: no limitations





- History of Present Illness


Initial Comments: 





PT SLIPPED ON CURB AND FELL.  HIT HEAD AND R WRIST.  


DENIES ANY PAIN OTHER THAN IN R WRIST.  NO HA.  NO NECK PAIN.  


Occurred: just prior to arrival


Severity: severe


Pain Location: upper extremity


Method of Injury: fall


Improving Factors: nothing


Worsening Factors: movement


Loss of Consciousness: no loss of consciousness


Associated Symptoms (Fall): denies symptoms


Allergies/Adverse Reactions: 


Allergies





Influenza Vaccines Allergy (Verified 18 14:01)


   


Aspirin Adverse Reaction (Verified 18 14:01)


   


Prednisone Adverse Reaction (Verified 18 14:01)


   





Home Medications: 


Ambulatory Orders





Bisoprolol & Hydrochlorothiazi [Ziac 10-6.25 mg] 1 ea PO BID 18 


HYDROcodone 5MG/APAP 325MG [Norco 5/325] 1 tab PO Q6H PRN 18 


Levothyroxine Sodium 25 mcg PO ACBK 18 


Lisinopril 10 mg PO DAILY 18 


Nitrofurantoin Macrocrystal [Nitrofurantoin Macrocryst] 100 mg PO BEDTIME 

18 


Potassium Chloride [K-Tab] 20 meq PO BIDFD 18 


Ascorbic Acid [Vitamin C] 1,000 mg PO DAILY 19 


Cholecalciferol [Vitamin D3] 5,000 unit PO DAILY 19 


Furosemide Tab [Lasix Tab] 40 mg PO BID 19 











Review of Systems





- Review of Systems


Constitutional: States: no symptoms reported


EENTM: Denies: eye pain, ear pain, nose pain, throat pain, mouth pain


Respiratory: States: no symptoms reported


Cardiology: States: no symptoms reported


Gastrointestinal/Abdominal: States: no symptoms reported.  Denies: abdominal 

pain


Genitourinary: States: no symptoms reported


Musculoskeletal: States: other - PAIN R WRIST..  Denies: back pain, neck pain


Skin: States: lumps - R FRONTAL SCALP. 


Neurological: States: other - PAIN R WRIST. .  Denies: headache, paresthesia, 

tingling


Endocrine: States: no symptoms reported


Hematologic/Lymphatic: States: no symptoms reported


All other Systems: Reviewed and Negative





Past Medical History (General)





- Patient Medical History


Hx Stroke: No


Hx Asthma: No


Hx of COPD: No


Hx Congestive Heart Failure: No


Hx Pacemaker: No


Hx Hypertension: Yes


Hx Diabetes: Yes - type 2 diet


Hx Cancer: Yes - Breast


Hx MRSA: No


Surgical History: Hysterectomy





- Vaccination History


Hx Tetanus, Diphtheria Vaccination: No


Hx Influenza Vaccination: No


Hx Pneumococcal Vaccination: No





- Social History


Hx Tobacco Use: No


Hx Alcohol Use: No


Hx Physical Abuse: No


Hx Emotional Abuse: No





Family Medical History





- Family History


  ** Mother


Family History: No Known


Living Status: 





Physical Exam





- Physical Exam


General Appearance: Alert, Obvious distress


Head Injury: contusions - R FRONTAL SCALP. 


Eye Exam: bilateral normal


ENT Exam: hearing grossly normal, no evidence of ENT injury, no dental injury


Neck Exam: non-tender, full range of motion, normal alignment, normal inspection


Cardiovascular/Respiratory: regular rate, rhythm, no M/R/G, no JVD


Gastrointestinal/Abdominal: normal bowel sounds, non tender, soft


Back Exam: normal inspection, no CVA tenderness, no vertebral tenderness


Extremity Exam: no pedal edema, pelvis stable, bony-point tenderness - R WRIST 

TTP.  OBVIOUS DEFORMITY.  REMAINING EXTREMITIES NTTP AND PAINLESS FROM.  


Neurologic: CNs II-XII nml as tested, no motor/sensory deficits, alert, normal 

mood/affect, oriented x 3


Skin Exam: other - MILD CONTUSION AND HEMATOMA OF SCALP.  





- Stan Coma Score


Stan Total: 15





Progress





- Progress


Progress: 





19 17:01


CT HEAD = SUPERFICIAL SCALP HEMATOMA. 


R WRIST XRAY = DISPLACED FRX OF RADIUS AND ULNA.  SHE IS NEUROVASCULARLY IN TACT

(HANDS WARM, PULSES PRESENT, MOTOR IN TACT, NO PARESTHESIAS). I SPOKE WITH DR HAYS, ORTHO, WHO ADVISED TO PLACE SUGAR TONG SPLINT AND HE WILL SEE HER IN AM.  


FAMILY REQUESTED ADMISSION FOR PAIN CONTROL AND FEAR OF HER FALLING AGAIN WITH 

THE WRIST FRX.  THIS IS REASONABLE.  I SPOKE WITH MODE ABRAMS, HOSPITALIST,

WHO IS ADMITTING OBSERVATION.  THANK YOU VERY MUCH, [facility], FOR ADMITTING FOR 

CARE.    


19 17:09








- EKG/XRAY/CT


CT Ordered: Yes





Departure





- Departure


Clinical Impression: 


 Right wrist pain





Fracture of radius, distal, right, closed


Qualifiers:


 Encounter type: initial encounter Fracture morphology: other extra-articular 

Qualified Code(s): S52.551A - Other extraarticular fracture of lower end of 

right radius, initial encounter for closed fracture





Fracture of distal end of right ulna


Qualifiers:


 Encounter type: initial encounter Fracture type: closed Fracture morphology: 

other fracture Qualified Code(s): S52.691A - Other fracture of lower end of 

right ulna, initial encounter for closed fracture





Disposition: Admit Patient


Condition: Fair


Referrals: 


JARRETT PICKERING [Primary Care Provider] - 1-2 Weeks


Home Medications: 


Ambulatory Orders





Bisoprolol & Hydrochlorothiazi [Ziac 10-6.25 mg] 1 ea PO BID 18 


HYDROcodone 5MG/APAP 325MG [Norco 5/325] 1 tab PO Q6H PRN 18 


Levothyroxine Sodium 25 mcg PO ACBK 18 


Lisinopril 10 mg PO DAILY 18 


Nitrofurantoin Macrocrystal [Nitrofurantoin Macrocryst] 100 mg PO BEDTIME 

18 


Potassium Chloride [K-Tab] 20 meq PO BIDFD 18 


Ascorbic Acid [Vitamin C] 1,000 mg PO DAILY 19 


Cholecalciferol [Vitamin D3] 5,000 unit PO DAILY 19 


Furosemide Tab [Lasix Tab] 40 mg PO BID 19 











Decision To Admit





- Decistion To Admit


Decision to Admit Reason: Admit from ER


Decision to Admit Date: 19


Decision to Admit Time: 17:11

## 2019-08-27 NOTE — HP
SUPERVISING PHYSICIAN:  Yvan Snell M.D.



CHIEF COMPLAINT:  Head contusion.



HISTORY OF PRESENT ILLNESS:  This is an 83 year-old female who came into the 
Emergency Room status post a fall.  Apparently she slipped on a curb and fell 
and hit her head as well as sustained some right wrist pain.  In the E. R., her 
workup included a CT scan of the head which did not show any acute brain 
abnormalities, however, she did have a significant hematoma to the right 
forehead/scalp.  Additionally she had a wrist x-ray which shoed a displaced both
radius and ulnar fracture.  Apparently she has actually had episodes of falls 
more frequently, according to the granddaughter, and she sustained bilateral 
wrist fractures in the past.  The most recent episode it looks like was back in 
August of 2018, approximately 1 year ago today.  The family was concerned about 
her returning home due to the wrist fracture and her ability to get around.  
They state that she has a wheelchair and has difficulty getting around anyway.  
At the time of examination the patient is alert.  She does not admit to any loss
of consciousness.  



PAST MEDICAL HISTORY: 

1.   Hypertension.

2.   Hypothyroidism.

3.   Chronic vertigo.

4.   Type 2 diabetes mellitus which is diet controlled.

5.   Chronic urinary tract infections on daily Macrobid.

6.   Hearing loss.

7.   Breast cancer.



PAST SURGICAL HISTORY:

1.   Bilateral mastectomies in 1997.

2.   Hysterectomy in 1997.

3.   Bilateral hip replacement.

4.   Cervical spine infusion.

5.   Bilateral cataract removal.



CURRENT MEDICATIONS:

1.   Vitamin C 1,000 mg p.o. daily.

2.   Bisoprolol HCTZ 10/6.25 mg 1 tab p.o. q. b.i.d.

3.   Vitamin D3 5,000 units p.o. daily.

4.   Furosemide 40 mg p.o. b.i.d.

5.   Hydrocodone 5/325 1 tab every 6 hours p.r.n. for pain.

6.   Levothyroxine 25 mg p.o. before breakfast.

7.   Lisinopril 10 mg p.o. daily.

8.   Macrobid 100 mg p.o. at bedtime.

9.   Potassium 20 mEq p.o. b.i.d.



ALLERGIES:  INFLUENZA VACCINE, ASPIRING AND PREDNISONE.



FAMILY HISTORY:   Reviewed and noncontributory.



SOCIAL HISTORY:  The patient is retired.  Lives with her sister.  She is 
.  No drinking.  No illicit drugs.  No smoking.



REVIEW OF SYSTEMS: 

CONSTITUTIONAL:  No fever or chills.  No recent weight loss or weight gain.

HEENT:  She does have a headache from her current fall but no chronic headaches.
 No vision changes.  No ear pain but she does have chronic hearing loss.  No 
throat pain.

RESPIRATORY:  No cough, hemoptysis or pleuritic chest pain.

CARDIOVASCULAR:  No chest pain, palpitations or peripheral edema.

GASTROINTESTINAL:  No nausea, vomiting, diarrhea, constipation or abdominal 
pain.

GENITOURINARY:  No dysuria, frequency or flank pain but she does have chronic 
urinary tract infections and takes Macrobid on a regular basis.

MUSCULOSKELETAL:  Right wrist pain, otherwise no other muscle cramps or joint 
swelling at this time. 

HEMATOLOGIC:  Positive for easy bruising but no transfusion reaction.

INTEGUMENT:  No rashes, lesions or wounds.

NEUROLOGIC:  No syncope, seizures or paresthesias, but she does have vertigo on 
a chronic basis.



PHYSICAL EXAMINATION: 



VITAL SIGNS:  Blood pressure 131/79, heart rate 64, respiratory rate 16, 
temperature 97.9, oxygen saturation 99%.



GENERAL:  Ms. Erazo is an 83 year-old female who is in no active distress at 
this time.



HEENT:  Head is normocephalic but she does have a hematoma to the right upper 
forehead/scalp.  It is ecchymotic and edematous.  It is probably around 3 cm 
total.  Pupils are equal and reactive.  Nose with no drainage.  Throat with a 
moist mucosa.



CHEST:  Lungs are clear to auscultation bilaterally.



CARDIOVASCULAR:  Regular rate and rhythm.  Normal S1 and S2.



ABDOMEN:  Soft.  Positive bowel sounds.  No tenderness to palpation.  No 
organomegaly.



GENITOURINARY:  Deferred.



EXTREMITIES:  Lower extremities with no edema.  The right wrist/forearm has some
deformity.  Radial and ulnar pulses are positive.  Capillary refill is less than
2 seconds.  The extremity is warm and has no current paresthesias at this time.



NEUROLOGIC:  The patient is alert and oriented.  Cranial nerves II-XII are 
grossly intact.  There are no focal deficits.  She does have more weakness with 
the right hand but that is probably secondary to her fracture.



LABORATORY:  She has not had any labs done yet.



ASSESSMENT: 

1.   Fall resulting in a head contusion with possible concussion, although there
has

      not been any loss of consciousness.

2.   Right radius and ulnar fracture resultant from #1.

3.   Diabetes which is diet controlled.

4.   Hypothyroidism.

5.   Hypertension.

6.   Chronic vertigo with a reported history of more frequent falls lately.



PLAN:  The patient will be admitted under observation.  I am going to perform 
neuro checks every 4 hours and also administer pain medication as needed.  Our 
main concern at this time is to ensure that she has no decline in her 
neurological status.  As stated above the CT scan of the head was not indicative
of any acute bleed or infarct.  I am also going to put her on a cardiac monitor 
as well.  I will resume her home medications.  I am going to go ahead and check 
a chemistry, CBC and her thyroid studies at this time as well.  Anticipate 
discharge tomorrow.  We will probably need to see about getting home health for 
this patient as she is having increased difficulties.  Additionally, I put a 
consult in for Dr. Diaz to see the patient while she is here. I actually spoke 
with him by phone after he reviewed the x-ray, and he states the patient will 
likely need surgery to fix. Attempted to contact her son by phone, with no 
answer. Dr. Diaz will speak with the patient and son tomorrow regarding options. 

#[id number]

[facility]

## 2019-08-27 NOTE — RAD
EXAM DESCRIPTION: 

Wrist,Right 3 Views: CR/DR/XR



CLINICAL HISTORY:

83 years Female s/p fall with pain



COMPARISON: 

Right wrist 11/20/2018



TECHNIQUE: 

3 VIEWS AP. Lateral. Oblique. Right wrist.



FINDINGS: 

The minimally compressed fracture in the distal right radius on

the prior is now completely  with the distal radius

displaced in the radial and dorsal direction on the radial shaft.

At least 1 cm of overlap. No radiocarpal dislocation. Soft tissue

swelling. In addition there is now a displaced fracture of the

distal ulna at approximately the same distance from the carpal

joint with the ulnocarpal joint maintained. The distal ulna is

dorsal to the ulnar shaft and is overlapping almost 1 cm. Soft

tissue swelling. Calcification in the TFCC. Minimally displaced

fracture of the ulnar styloid. Overall bone density is decreased.

Advanced arthrosis of the right thumb carpometacarpal joint.

Arthrosis also seen in the PIP joints of the fingers and DIP

joint of the thumb. Vascular calcifications.



IMPRESSION: 

New fracture of the distal right radius, proximal to the prior

fracture seen in November 2018. Distal radius displacement in the

radial and dorsal direction from the radial shaft with

radiocarpal joint intact. New fracture and displacement of the

distal ulna from the shaft of the ulna with near the ulnar

styloid fracture. No ulnar carpal dislocation.



Electronically signed by:  Bucky Bland MD  8/27/2019 3:25 PM CDT

Workstation: [id number]

## 2019-08-27 NOTE — CT
EXAM DESCRIPTION: Head



CLINICAL HISTORY: hit head



COMPARISON: CT head dated 8/31/2018.



TECHNIQUE: Contiguous axial images through the head were obtained

without intravenous contrast administration. Sagittal and coronal

reconstructions were reviewed.



FINDINGS:

Right frontal scalp hematoma.  No evidence of acute major

vascular territorial infarct or intraparenchymal hemorrhage. No

intra-axial or extra-axial fluid collections are identified. The

ventricles and cisterns appear normal in caliber. The sella and

suprasellar regions appear normal. The structures of the

posterior fossa are intact. The globes are intact bilaterally.

The visualized paranasal sinuses and mastoid air cells are

well-aerated. Review of the bones demonstrates no gross

instability.



IMPRESSION:



Right frontal scalp hematoma. No underlying acute intracranial

process.



This exam was performed according to our departmental

dose-optimization program, which includes automated exposure

control, adjustment of the mA and/or kV according to patient size

and/or use of iterative reconstruction technique.



Electronically signed by:  Ami Azar MD  8/27/2019 3:09 PM

CDT Workstation: [id number]

## 2019-08-28 PROCEDURE — 3E0T3BZ INTRODUCTION OF ANESTHETIC AGENT INTO PERIPHERAL NERVES AND PLEXI, PERCUTANEOUS APPROACH: ICD-10-PCS | Performed by: ORTHOPAEDIC SURGERY

## 2019-08-28 RX ADMIN — VANCOMYCIN HYDROCHLORIDE SCH MLS/HR: 500 INJECTION, POWDER, LYOPHILIZED, FOR SOLUTION INTRAVENOUS at 14:52

## 2019-08-28 RX ADMIN — CHOLECALCIFEROL TAB 50 MCG (2000 UNIT) SCH: 50 TAB at 10:07

## 2019-08-28 RX ADMIN — LISINOPRIL SCH: 10 TABLET ORAL at 10:07

## 2019-08-28 RX ADMIN — POTASSIUM CHLORIDE SCH: 20 TABLET, EXTENDED RELEASE ORAL at 10:07

## 2019-08-28 RX ADMIN — LEVOTHYROXINE SODIUM SCH MG: 100 TABLET ORAL at 06:07

## 2019-08-28 RX ADMIN — NITROFURANTOIN MONOHYDRATE/MACROCRYSTALLINE SCH: 25; 75 CAPSULE ORAL at 21:10

## 2019-08-28 RX ADMIN — FUROSEMIDE SCH: 40 TABLET ORAL at 10:07

## 2019-08-28 RX ADMIN — OXYCODONE HYDROCHLORIDE AND ACETAMINOPHEN SCH: 500 TABLET ORAL at 10:07

## 2019-08-28 RX ADMIN — FUROSEMIDE SCH: 40 TABLET ORAL at 21:10

## 2019-08-28 RX ADMIN — POTASSIUM CHLORIDE SCH: 20 TABLET, EXTENDED RELEASE ORAL at 17:55

## 2019-08-28 RX ADMIN — CEFAZOLIN SODIUM SCH MLS/HR: 2 SOLUTION INTRAVENOUS at 23:30

## 2019-08-28 RX ADMIN — CEFAZOLIN SODIUM SCH: 2 SOLUTION INTRAVENOUS at 17:54

## 2019-08-28 NOTE — RAD
EXAM:  XR Right Wrist, 2 Views



CLINICAL HISTORY:  post op



TECHNIQUE:  Frontal and lateral views of the right wrist.



COMPARISON:  No relevant prior studies available.



FINDINGS:

  Limitations:  None.

  Bones/joints:  Right intact palmar plate on the distal radius

with multiple fixation screws present.  There is a short plate

and two screws in the distal ulna.  Anatomic alignment fractures

of the distal radius and ulna.  No change position of the

fracture of the ulnar styloid.  Trabecular pattern and cortical

surfaces of the foot are intact.   Moderate primary

osteoarthritic changes involving the STT and first CMC joints.

  Soft tissues: Soft tissue swelling present.

  Vasculature:  There is atherosclerotic calcification.

  

IMPRESSION:     

  Anatomic alignment following internal fixation of fractures of

the radius and ulna.



Electronically signed by:  June Denny MD  8/28/2019 7:06 PM

CDT Workstation: [id number]

## 2019-08-28 NOTE — PN
SUPERVISING PHYSICIAN:  Yvan Snell MD



DATE:  08/28/19



SUBJECTIVE:  This morning, the patient did have an episode of diaphoresis and 
shortness of breath.  This was after she was in a lying position and went to a 
more sitting position.  She does have a history of vertigo and after the 
position change and a 500 mL LR bolus, she improved.  She did have a dip in her 
blood pressure to 96 systolic, but it did improve to 115 systolic after the 
bolus.  Her right wrist pain is controlled.  She does not have any numbness or 
tingling in that extremity.



OBJECTIVE:  

VITAL SIGNS:  Blood pressure 131/76.  Heart rate 74.  Respiratory rate 18.  
Temperature 98.1.  Oxygen saturation 97%.

GENERAL:  Ms. Erazo is an 83-year-old female who is in no active distress 
currently.

NEUROLOGIC:  Alert and oriented, but hard of hearing.  

LUNGS:  Clear to auscultation bilaterally.

CARDIOVASCULAR:  Regular rate and rhythm.  Normal S1, S2.

ABDOMEN:  Soft.  Positive bowel sounds.  

EXTREMITIES: Lower extremities with no edema.  Pulses 2+.  Capillary refill is 
less than 2 seconds.  Her right upper extremity is still with a sugar-tong 
splint n place.  Her fingers are warm.  Capillary refill is less than 2 seconds.
 She has sensation in all of her fingers.  



LABORATORY:  Labs reviewed show white count 10.5, hemoglobin 13.4, platelet 
count 154.  Chemistry shows sodium 141, potassium 3.3, chloride 102, CO2 27, BUN
36, creatinine 0.84, glucose 121, calcium 8.4, TSH 3.86, free T4 1.06.



ASSESSMENT: 

1.   Fall resulting in a head contusion with possible concussion, although there
has

      been no loss of consciousness.

2.   Right radius and ulnar fracture.

3.   Diabetes mellitus.

4.   Hypothyroidism.

5.   Hypertension.

6.   Chronic vertigo with a reported history of more frequent falls lately.

7.   Hypokalemia.



PLAN:  Dr. Diaz actually evaluated the patient this morning and feels that the 
patient needs surgery.  He will speak with the son at his office regarding this.
 Due to the hypokalemia, I am going to give some replacement through her IV.  We
will continue all other care at this time.



#[id number]

MTDD

## 2019-08-28 NOTE — RAD
EXAM DESCRIPTION: Chest,1 View



CLINICAL HISTORY: 83 years Female, shortness of breath



COMPARISON: None.



TECHNIQUE: AP portable chest.



FINDINGS:



Heart size is prominent with mildly prominent central pulmonary

vascularity. No giovani congestive failure.



Density in the left upper lobe above the aortic arch could be

mass or infiltrate. PA and lateral chest x-ray with the hand

removed from the chest area may be helpful. Alternatively, chest

CT can be performed. Plate and screws in the lower C-spine. No

pneumothorax or pleural effusion.



Marked degenerative changes in the shoulders..



IMPRESSION:



Indeterminate focal increased density in the left upper lobe. See

above.



Electronically signed by:  Dashawn Guadarrama MD  8/28/2019 10:41

AM CDT Workstation: [id number]

## 2019-08-29 RX ADMIN — VANCOMYCIN HYDROCHLORIDE SCH MLS/HR: 500 INJECTION, POWDER, LYOPHILIZED, FOR SOLUTION INTRAVENOUS at 11:55

## 2019-08-29 RX ADMIN — POTASSIUM CHLORIDE SCH MEQ: 20 TABLET, EXTENDED RELEASE ORAL at 16:33

## 2019-08-29 RX ADMIN — HYDROCODONE BITARTRATE AND ACETAMINOPHEN PRN EA: 5; 325 TABLET ORAL at 16:32

## 2019-08-29 RX ADMIN — LISINOPRIL SCH: 10 TABLET ORAL at 10:59

## 2019-08-29 RX ADMIN — OXYCODONE HYDROCHLORIDE AND ACETAMINOPHEN SCH MG: 500 TABLET ORAL at 08:08

## 2019-08-29 RX ADMIN — LEVOTHYROXINE SODIUM SCH MG: 100 TABLET ORAL at 06:28

## 2019-08-29 RX ADMIN — FUROSEMIDE SCH MG: 40 TABLET ORAL at 08:08

## 2019-08-29 RX ADMIN — CHOLECALCIFEROL TAB 50 MCG (2000 UNIT) SCH IU: 50 TAB at 08:09

## 2019-08-29 RX ADMIN — HYDROCODONE BITARTRATE AND ACETAMINOPHEN PRN EA: 5; 325 TABLET ORAL at 10:59

## 2019-08-29 RX ADMIN — NITROFURANTOIN MONOHYDRATE/MACROCRYSTALLINE SCH MG: 25; 75 CAPSULE ORAL at 21:00

## 2019-08-29 RX ADMIN — POTASSIUM CHLORIDE SCH MEQ: 20 TABLET, EXTENDED RELEASE ORAL at 08:08

## 2019-08-29 RX ADMIN — CEFAZOLIN SODIUM SCH MLS/HR: 2 SOLUTION INTRAVENOUS at 08:08

## 2019-08-29 RX ADMIN — FUROSEMIDE SCH MG: 40 TABLET ORAL at 21:00

## 2019-08-29 NOTE — OP
PREOPERATIVE DIAGNOSIS: 

1.  Distal both-bone forearm fracture.



POSTOPERATIVE DIAGNOSIS: 

1.  Distal both-bone forearm fracture.

2.  Open fracture of the ulna.



PROCEDURE: 

1.  Open reduction internal fixation of distal both bones.



SURGEON:  Fitz Diaz MD.



ASSISTANT:  Bucky Vaughan CST, SA-C.



ANESTHESIA:  General anesthesia.



COMPLICATIONS:  None.



FINDINGS: 

1.  Comminuted fracture of the distal radius .

2.  Puncture wound from what appears to be an open fracture of the distal ulna.

3.  Very osteoporotic bone.



INDICATION FOR PROCEDURE:  Ms. Erazo has a history of a fall that occurred on 
the day of presentation.  She had the acute onset of pain in the wrist.  She was
admitted after being splinted.  There was apparently no indication to the 
Emergency Room physician that there was open fracture component of this.  As 
such, it was not directly addressed.  I discussed with Ms. Erazo and her son 
the risks, benefits and alternatives to operative therapy for this and informed 
consent was obtained.



PROCEDURE:  The patient was brought to the Operating Room and placed in supine 
position.  General anesthesia was induced.  The patient's arm was sterilely 
prepped and draped.  Following prepping and draping, an incision was made and 
standard volar approach was used to the distal radius.  The fracture was 
identified and was debrided.  Reduction was performed and because of the 
severity of her osteoporosis, there was a defect measuring about 1.5 cm on the 
volar cortex.  Bone graft was placed into that defect.  It was subsequently 
plated with a combination of locking and cortical screws.  Attention was then 
focused over the ulna and an incision was made over the subcutaneous border.  
Dissection was carried down to the ulna and it was very thoroughly irrigated and
debrided.  Following that, a two-hole, one-third tubular plate was applied to 
the lateral aspect of the ulna since that was the most appropriate and stable 
area for it to be fixed.  All wounds were thoroughly irrigated and closed.  
Sterile dressings were placed followed by a volar splint.  The patient was then 
awoken from anesthesia and taken to Recovery.



POSTOPERATIVE PLAN:  We are going to admit her for some postoperative 
antibiotics as well as continued monitoring.  We will encourage range of motion 
of the digits.  



#[id number]

[facility]

## 2019-08-29 NOTE — PN
DATE:  08/29/19



SUBJECTIVE:  Ms. Erazo is doing well.  Her pain is well controlled.  



OBJECTIVE:  Afebrile.  Vital signs stable.  Dressing is clean.  She has intact 
sensation in the distribution of both the medial and ulnar nerves.  She has 
minimal swelling distally.



ASSESSMENT:  Status post open reduction internal fixation.



PLAN:  The plan at this point is to continue with her IV antibiotics and range 
of motion of the digits.



#[id number]

MTDD

## 2019-08-29 NOTE — PN
SUPERVISING PHYSICIAN:  Yvan Snell MD



DATE:  08/29/19



SUBJECTIVE:  The patient is sitting up in her chair.  Her sister is at her 
bedside.  The patient has no complaints of pain, shortness of breath, nausea or 
vomiting.  She still feels like she has a little bit of vertigo and has had that
on and off for many years.  We discussed her discharge plan and that she will 
have [facility] on discharge.  



OBJECTIVE:  

VITAL SIGNS:  Temperature 98.6.  Heart rate 73.  Blood pressure 114/66.  
Respiratory rate 17.  O2 saturation 96% on 2 liters nasal cannula.

RESPIRATORY:  Essentially clear to auscultation bilaterally.  

CARDIAC: Regular rate and rhythm.  

GASTROINTESTINAL: Abdomen is soft, nondistended, nontender.  Bowel sounds are 
positive.  

EXTREMITIES:  Capillary refills to her right hand is less than 3 seconds.  
Radial pulses are +2 bilaterally.  

NEUROLOGIC: Awake, alert and oriented times three.  



LABORATORY:  WBCs 10,100, hemoglobin 12.1, hematocrit 35.8.  Electrolytes are 
basically within normal limits.  BUN slightly high, but improved at 26.  Blood 
sugars have run between 121 and 189.  All other labs and films have been 
reviewed via the EMR.  



ASSESSMENT: 

1.   Fall resulting in a head contusion with possible concussion, although there
has

      been no loss of consciousness.

2.   Right radius and ulnar fracture status post ORIF performed by Dr. Fitz Diaz,

      orthopedic surgeon, postoperative day #1.

3.   Diabetes mellitus, type 2.

4.   Hypothyroidism.

5.   Hypertension.

6.   Chronic vertigo with a reported history of more frequent falls lately.

7.   Hypokalemia, resolved.



PLAN:  We will continue present supportive care.  Orthopedic issues will be per 
Dr. Fitz Diaz, orthopedic surgeon.  She will have [facility] after 
discharge.  I have also consulted physical therapy to make sure she is safe to 
go home.  I will hold on any labs tomorrow and if she is able to, we plan on 
discharging tomorrow.  She does live with her sister and hopefully they can get 
the care she requires at home.  I have also contacted  to make 
sure that is taken care of as well.  We will continue to monitor the patient 
closely and follow as needed.  



#[id number]

[facility]

## 2019-08-30 VITALS — SYSTOLIC BLOOD PRESSURE: 138 MMHG | DIASTOLIC BLOOD PRESSURE: 71 MMHG | TEMPERATURE: 98.4 F

## 2019-08-30 VITALS — OXYGEN SATURATION: 94 %

## 2019-08-30 RX ADMIN — HYDROCODONE BITARTRATE AND ACETAMINOPHEN PRN EA: 5; 325 TABLET ORAL at 08:29

## 2019-08-30 RX ADMIN — CHOLECALCIFEROL TAB 50 MCG (2000 UNIT) SCH IU: 50 TAB at 08:30

## 2019-08-30 RX ADMIN — HYDROCODONE BITARTRATE AND ACETAMINOPHEN PRN EA: 5; 325 TABLET ORAL at 00:40

## 2019-08-30 RX ADMIN — POTASSIUM CHLORIDE SCH MEQ: 20 TABLET, EXTENDED RELEASE ORAL at 07:28

## 2019-08-30 RX ADMIN — FUROSEMIDE SCH MG: 40 TABLET ORAL at 08:31

## 2019-08-30 RX ADMIN — OXYCODONE HYDROCHLORIDE AND ACETAMINOPHEN SCH MG: 500 TABLET ORAL at 08:31

## 2019-08-30 RX ADMIN — LEVOTHYROXINE SODIUM SCH MG: 100 TABLET ORAL at 06:57

## 2019-08-30 RX ADMIN — LISINOPRIL SCH MG: 10 TABLET ORAL at 08:30

## 2019-08-30 NOTE — PN
DATE:  08/30/19



SUBJECTIVE:  Ms. Erazo is doing well and her pain is well controlled.



OBJECTIVE:  Afebrile.  Vital signs stable.  Wounds are clean.  There are no 
signs or symptoms of infection.



ASSESSMENT:  Status post ORIF of the wrist.



PLAN:  The plan at this point is for her to be discharged today.  We will see 
her back in the clinic in about one week.



#[id number]

MTDD

## 2019-08-30 NOTE — DS
SUPERVISING PHYSICIAN:  Yvan Snell M.D.



DISCHARGE DIAGNOSIS: 

1.   Fall resulting in a head contusion with possible concussion, although there
has

      been no loss of consciousness.

2.   Right radius and ulnar fracture status post ORIF performed by Dr. Fitz Diaz,

      orthopedic surgeon, postoperative day #2.

3.   Diabetes mellitus, type 2.

4.   Hypothyroidism.

5.   Hypertension.

6.   Chronic vertigo with a reported history of more frequent falls lately.

7.   Hypokalemia that has resolved.



HISTORY OF PRESENT ILLNESS:  This is an 83 year-old female patient who presented
to the Emergency Room status post a fall.  She slipped on a curb and fell and 
hit her head as well as sustained some right wrist pain.  In the E. R., her 
workup included a CT scan of the head which did not show any acute brain 
abnormalities, however, she did have a significant hematoma to the right 
forehead/scalp.  Additionally she had a wrist x-ray which shoed a displaced both
radius and ulnar fracture.  She has actually had episodes of falls more 
frequently, according to her granddaughter, and has sustained bilateral wrist 
fractures in the past.  The last episode per our EMR is in August of 2018 and 
the family was concerned about her returning home due to the wrist fracture and 
her ability to get around.  She has a wheelchair and has difficulty getting 
around anyway.  She was admitted to the hospital.



HOSPITAL COURSE:  She initially was placed in observation.  Neuro checks were 
done.  Pain medication was ordered.  Her neuro status remained intact.  Her home
medications were also resumed.  Dr. Diaz, orthopedic surgeon, was consulted to 
see the patient in regards to her fractures.  Dr. Diaz felt that she would need 
to have surgical intervention.  The morning prior to her surgery she had an 
episode of diaphoresis as well as shortness of breath.  She went from a lying 
position to a sitting position and she does have a significant history of 
vertigo.  She received a 500 mL bolus of LR.  She did have a drop in her blood 
pressure to 96 systolic but it improved to 115 systolic after the bolus.  Blood 
sugars were within normal limits.  Thyroid functions were within normal limits. 
She was taken to surgery for an open reduction and internal fixation of that 
right radius and ulna.  She had some mild hypotension in the O. R. but she was 
given fluids.  She also had some vertigo-type symptoms.  Once again those 
resolved without problems.  Physical Therapy was consulted in regards to both 
her wrist and her safety for going home.  Physical Therapy felt she would 
benefit from home health physical therapy in order to work on balance and safety
at home.   The patient was safe to discharge home once medically appropriate 
with assistance from her family.  The patient has elected to have [facility] for physical therapy and wound care.  She will be discharged home today.



LABORATORY:  WBCs remained stable at 10.5 and 10.1 with hemoglobin 12.1, 
hematocrit 35.8.  Electrolytes initially showed a low potassium of 3..3 but 
after given supplementation and fluids it was up to 3.6.  The remainder of her 
electrolytes were within normal limits.  Blood sugars ran between 121 and 189.  



RADIOLOGY :  As per the History of Present Illness.



DISCHARGE PLAN:  The patient will be discharged home in stable condition.  She 
is to resume her previous diet as well as her previous medications.  She is to 
followup with her primary care physician, Dr. Kingsley Holbrook, within 1 to 2 
weeks as well as Dr. Fitz Diaz, her orthopedic surgeon, within 1 to 2 weeks.  
She will have [facility] for wound care and physical therapy.  She is 
to return to the hospital or call Dr. Diaz or Dr. Holbrook for any problems or 
complications.



DISCHARGE MEDICATIONS:

1.   Hydrocodone.

2.   Lisinopril.

3.   Potassium chloride.

4.   Levothyroxine.

5.   Ziac.

6.   Macrobid.

7.   Vitamin C.

8.   Vitamin D3.

9.   Furosemide.



#[id number]

[facility]

## 2019-09-04 NOTE — RAD
PROVIDED CLINICAL HISTORY/REASON FOR EXAM: ORIF 



Findings/impression: Two intraoperative fluoroscopic images of a

right wrist ORIF. No obvious signs of hardware complication.

Dose: Not provided

Time: 38 seconds



Electronically signed by:  Jovan Brock MD  9/4/2019 8:51 AM

CDT Workstation: [id number]

## 2019-09-06 ENCOUNTER — HOSPITAL ENCOUNTER (OUTPATIENT)
Dept: HOSPITAL 39 - RAD | Age: 84
End: 2019-09-06
Attending: ORTHOPAEDIC SURGERY
Payer: MEDICARE

## 2019-09-06 DIAGNOSIS — S52.501D: ICD-10-CM

## 2019-09-06 DIAGNOSIS — S52.601D: Primary | ICD-10-CM

## 2019-09-06 DIAGNOSIS — Z98.890: ICD-10-CM

## 2019-09-06 NOTE — RAD
PROVIDED CLINICAL HISTORY/REASON FOR EXAM: WRIST PAIN 



Findings: 



Number of images: Three 



Location: Right wrist



Similar osteopenia, degenerative changes and vascular

calcifications. No acute fracture is identified. No hardware

complication. Redemonstrated internally fixated distal right

radius and ulna fractures. Stable alignment. No significant

interval healing.



IMPRESSION:

Stable alignment of the internally fixated right radius and ulna

fractures.



Electronically signed by:  Jovan Brock MD  9/6/2019 1:28 PM

CDT Workstation: [id number]

## 2019-10-07 ENCOUNTER — HOSPITAL ENCOUNTER (OUTPATIENT)
Dept: HOSPITAL 39 - RAD | Age: 84
End: 2019-10-07
Attending: ORTHOPAEDIC SURGERY
Payer: MEDICARE

## 2019-10-07 DIAGNOSIS — S52.501D: Primary | ICD-10-CM

## 2019-10-07 DIAGNOSIS — Z98.890: ICD-10-CM

## 2019-10-07 DIAGNOSIS — S52.611D: ICD-10-CM

## 2019-10-07 DIAGNOSIS — I70.90: ICD-10-CM

## 2019-10-07 DIAGNOSIS — M85.831: ICD-10-CM

## 2019-10-07 DIAGNOSIS — M18.11: ICD-10-CM

## 2019-10-07 NOTE — RAD
EXAM DESCRIPTION: 



Wrist,Right 3 Views



CLINICAL HISTORY: 



CLOSED FX OF RADIUS AND ULNA



COMPARISON: 



September 6, 2019



IMPRESSION: 



3 views of the right wrist again demonstrate comminuted fracture

of the distal radius with volar plate and screw fixation. No

hardware failure or loosening. Progressive indistinctness of the

fracture margins is seen without significant ridging bony union

at this time.



Transversely oriented mildly displaced fracture of the

metadiaphyseal region distal radius is again seen with ulnar side

plate and screw fixation. Interval proximally 3 mm ulnar

displacement of the distal fracture fragment is seen compared to

previous exam with slightly increased distance between the

fracture fragments. No bridging callus formation or bony union is

seen at this time.



Mildly displaced ulnar styloid avulsion fracture is stable.



Osseous structures are diffusely osteopenic. Moderate to severe

osteoarthritic changes of the first carpometacarpal joint and STT

joints is seen with mild to moderate osteoarthritic changes of

the metacarpophalangeal joints.



Severe vascular calcifications are again seen.





Electronically signed by:  Bennie Carrasco MD  10/7/2019 12:39 PM CDT

Workstation: [id number]

## 2019-11-04 ENCOUNTER — HOSPITAL ENCOUNTER (OUTPATIENT)
Dept: HOSPITAL 39 - RAD | Age: 84
End: 2019-11-04
Attending: ORTHOPAEDIC SURGERY
Payer: MEDICARE

## 2019-11-04 DIAGNOSIS — S52.611D: ICD-10-CM

## 2019-11-04 DIAGNOSIS — Z98.890: ICD-10-CM

## 2019-11-04 DIAGNOSIS — S52.501D: Primary | ICD-10-CM

## 2019-11-04 NOTE — RAD
EXAM DESCRIPTION: 



Wrist,Right 3 Views



CLINICAL HISTORY: 



CLOSED FX OF RADIUS AND ULNA



COMPARISON: 



October 7, 2019



FINDINGS: 3 views of the right wrist again demonstrate volar

plate and screw fixation of a fracture involving the distal

radius with continued indistinctness of the fracture margins and

bridging bony union increased from previous exam. No hardware

failure or loosening.



Osseous structures are diffusely osteopenic.



Transverse fracture of the distal ulna is seen with lateral plate

and screw fixation. No significant bridging bony union is seen

and findings suggest nonunion of this fracture.



Mildly displaced ulnar styloid process avulsion fracture with

evidence of nonunion.



Moderate to severe osteoarthritic changes of the radiocarpal and

carpal metacarpal joints.



IMPRESSION: 



Progressive healing of right distal radius fracture status post

ORIF



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

along the fracture margin. Lateral plate and screw fixation is

seen without obvious hardware failure or loosening.





Electronically signed by:  Bennie Carrasco MD  11/4/2019 10:42 AM [facility]

Workstation: [id number]

## 2021-01-21 ENCOUNTER — HOSPITAL ENCOUNTER (OUTPATIENT)
Dept: HOSPITAL 39 - MRI | Age: 86
End: 2021-01-21
Attending: PHYSICIAN ASSISTANT
Payer: MEDICARE

## 2021-01-21 DIAGNOSIS — M25.551: ICD-10-CM

## 2021-01-21 DIAGNOSIS — H90.3: ICD-10-CM

## 2021-01-21 DIAGNOSIS — Z01.812: Primary | ICD-10-CM

## 2021-01-21 DIAGNOSIS — H81.393: ICD-10-CM

## 2021-01-21 NOTE — MRI
Study: MRI of the brain. 



Indication: OTHER PERPHERAL VERTIGO



Technique:  Multiplanar, multi sequence MRI of the brain obtained

with and without intravenous contrast. 



Comparison: None.



Findings:



No MRI evidence of acute ischemia, acute hemorrhage, mass, mass

effect, midline shift, or extra-axial fluid collection. No

pathologic enhancement.



Ventricles are normal in configuration without hydrocephalus.

Several remote small bilateral cerebellar infarcts noted. Tiny

developmental venous anomaly noted within the central right

cerebellum.



Patchy elevated T2/FLAIR signal abnormality is seen within the

periventricular and subcortical white matter. Although

nonspecific, this finding is most consistent with chronic

microvascular ischemic change. Global parenchymal volume loss

noted as well. 



Midline structures are intact. 



Paranasal sinuses are adequately aerated. 



Small right and tiny left mastoid effusions. Degenerative and

postsurgical changes of the cervical spine partially visualized. 





Impression:



No MRI evidence of acute intracranial abnormality.



Senescent changes. 



Several remote small bilateral cerebellar infarcts. 



Small right and tiny left mastoid effusions. 



Electronically signed by:  Ottoniel Grissom MD  1/21/2021 1:23 PM [facility]

Workstation: [id number]

## 2021-01-21 NOTE — RAD
2 radiographs right hip



Indication: PAIN IN RIGHT HIP



Comparison: None



Impression:



Post surgical changes of right total hip arthroplasty noted

cerclage wire at the greater trochanter. No fracture identified.

Evaluation for fracture is limited given the degree of

osteopenia. If high clinical concern for acute fracture,

correlation with MRI recommended given its greater sensitivity in

the osteopenic patient. If the patient cannot tolerate MRI

imaging or more urgent imaging is required, CT could be

performed, however it is less sensitive in the osteopenic patient

when compared to MRI.



Electronically signed by:  Ottoniel Grissom MD  1/21/2021 12:25 PM

[facility] Workstation: [id number]